# Patient Record
Sex: MALE | Race: WHITE | URBAN - METROPOLITAN AREA
[De-identification: names, ages, dates, MRNs, and addresses within clinical notes are randomized per-mention and may not be internally consistent; named-entity substitution may affect disease eponyms.]

---

## 2023-11-20 ENCOUNTER — OFFICE VISIT (OUTPATIENT)
Dept: URGENT CARE | Age: 48
End: 2023-11-20

## 2023-11-20 VITALS
HEART RATE: 82 BPM | HEIGHT: 71 IN | TEMPERATURE: 98.8 F | DIASTOLIC BLOOD PRESSURE: 74 MMHG | SYSTOLIC BLOOD PRESSURE: 129 MMHG | BODY MASS INDEX: 20.16 KG/M2 | WEIGHT: 144 LBS

## 2023-11-20 DIAGNOSIS — L25.9 CONTACT DERMATITIS, UNSPECIFIED CONTACT DERMATITIS TYPE, UNSPECIFIED TRIGGER: Primary | ICD-10-CM

## 2023-11-20 RX ORDER — PREDNISONE 10 MG/1
TABLET ORAL
Qty: 35 TABLET | Refills: 0 | Status: SHIPPED | OUTPATIENT
Start: 2023-11-20

## 2023-11-20 ASSESSMENT — ENCOUNTER SYMPTOMS
VOMITING: 0
FACIAL SWELLING: 0
NAUSEA: 0
COUGH: 0
ABDOMINAL PAIN: 0
SORE THROAT: 0
DIARRHEA: 0
RHINORRHEA: 0
WHEEZING: 0
SHORTNESS OF BREATH: 0
TROUBLE SWALLOWING: 0

## 2023-11-20 NOTE — PATIENT INSTRUCTIONS
Prednisone taper prescribed for systemic treatment - take as directed. Can use an oral antihistamine to help with itching - Claritin, Zyrtec, or Allegra  Cool compresses, or epsom salt soaked compresses can also help with itch. Can use emollient based creams/lotions (Eucerin, Cetaphil, or Aquaphor) to help with skin healing. Follow up in 7 days if symptoms persist or if symptoms worsen - skin condition may require PCP or dermatology follow up if it shows no signs of resolving with this treatment plan. New Prescriptions    PREDNISONE (DELTASONE) 10 MG TABLET    Take 4 pills by mouth daily for 5 day; then take 2 pills by mouth daily for 5 days; then take 1 pill by mouth daily for 5 days.

## 2023-11-20 NOTE — PROGRESS NOTES
Carolynne Schaumann (: 1975) is a 46 y.o. male, New patient, here for evaluation of the following chief complaint(s):  Rash (Rash developed about 6-8 months ago, not sure why. Went to another  in the summer and they gave hydrocortisone cream.  It calms the itching a little but it's still very bad at times.)      ASSESSMENT/PLAN:    ICD-10-CM    1. Contact dermatitis, unspecified contact dermatitis type, unspecified trigger  L25.9 predniSONE (DELTASONE) 10 MG tablet          Given distribution and xerotic characteristics, concern for possible contact dermatitis as cause of rash - unknown trigger or type. Prednisone taper prescribed for systemic treatment - take as directed. Can use an oral antihistamine to help with itching - Claritin, Zyrtec, or Allegra  Cool compresses, or epsom salt soaked compresses can also help. Can use emollient based creams/lotions (Eucerin, Cetaphil, or Aquaphor) to help with skin healing. Follow up in 7 days if symptoms persist or if symptoms worsen - skin condition may require PCP or dermatology follow up if it shows no signs of resolving with this treatment plan. SUBJECTIVE/OBJECTIVE:  HPI:   46 y.o. male presents for complaint of itchy rash over entire body x 6-8 months. Pt is unsure of how it started, thinks it started on his head, then went down to his torso. Started using a tea tree based body wash last week, every day, which helped to dry up the rashes, but states it made them much more itchy. Today used [place] soap, but notes that made his rash more itchy and sore. Notes he went to an outside [facility] for treatment roughly 6 months ago in the summer and was provided with a steroid cream, and notes the steroid cream helped with the itching, but otherwise did not resolve any of the rash. Steroid cream makes symptoms better. [place] soap made the itching worse. Has taken no other treatments for symptoms at this time.  Notes he has tried several different types